# Patient Record
Sex: FEMALE | URBAN - METROPOLITAN AREA
[De-identification: names, ages, dates, MRNs, and addresses within clinical notes are randomized per-mention and may not be internally consistent; named-entity substitution may affect disease eponyms.]

---

## 2021-11-30 ENCOUNTER — TELEPHONE (OUTPATIENT)
Dept: FAMILY MEDICINE | Facility: CLINIC | Age: 36
End: 2021-11-30

## 2021-11-30 NOTE — TELEPHONE ENCOUNTER
Reason for Call:  Other appointment    Detailed comments:  Lorenzo needs an appointment for an immigration physical to register for permanent residence. She is also vaccinated. Lorenzo will need a . Any messages left on phone number will need to be in [place] or he wont understand. Friend Petty (in patient contacts) has facilitated all family appointments & speaks english. Phone listed is friend Petty's. Another number to call is [phone] which is her  Alexandr but again, message or call must be in [place].      Phone Number Patient can be reached at: Home number on file [phone] (home)    Best Time: Evenings are best    Can we leave a detailed message on this number? YES    Call taken on 11/30/2021 at 1:01 PM by Patricia Manley